# Patient Record
(demographics unavailable — no encounter records)

---
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
## 2020-01-01 NOTE — NUR
PATIENT IS QUIETLY SITTING IN BED. FATHER AT BEDSIDE. 1:1 SITTER AT BEDSIDE. 
WILL CONTINUE TO MONITOR.

## 2020-01-02 NOTE — NUR
Patient discharged with v/s stable. She states relief and no longer wishing to 
harm self. Accompanied by father. Written and verbal after care instructions 
given and explained. Patient alert, oriented and verbalized understanding of 
instructions. Ambulatory with steady gait. All questions addressed prior to 
discharge. ID band removed. Patient advised to follow up with PMD and when to 
return to ER. Rx of  given. Patient educated on indication of medication 
including possible reaction and side effects. Opportunity to ask questions 
provided and answered.

## 2020-01-02 NOTE — NUR
PT LAYING IN BED, FATHER AT BEDSIDE. DENIES ANY PAIN. DENIES SI/HI OR 
AUDITORY/VISUAL HALLUCINATIONS. VSS. ALL NEEDS MET.

## 2020-01-02 NOTE — NUR
Per Joey Louise MD. Pt is to be released from hold. She is medically and 
psychologically cleared at this time. Father at bedside. Continue to monitor.